# Patient Record
(demographics unavailable — no encounter records)

---

## 2024-10-24 NOTE — REVIEW OF SYSTEMS
[Nasal Discharge] : nasal discharge [Sore Throat] : no sore throat [Cough] : cough [Negative] : Psychiatric

## 2024-10-24 NOTE — HISTORY OF PRESENT ILLNESS
[FreeTextEntry8] : pt here today with sinus pressure, cough  no sob or wheeze no temps symptoms for 2 weeks.. sef med with Dayquil

## 2024-10-24 NOTE — ASSESSMENT
[FreeTextEntry1] : acute sinus- Augmentin 875mg bid for 10 days. Flonase ns. return if worse or persist.  HTN- continue amlodipine 5 mg, and lisinopril 40mg daily- follow-up with cardiology Hyperlipidemia- continue Repatha 140mg every other week.

## 2024-10-24 NOTE — PHYSICAL EXAM
[No Acute Distress] : no acute distress [Normal Outer Ear/Nose] : the outer ears and nose were normal in appearance [No Respiratory Distress] : no respiratory distress  [Normal Rate] : normal rate  [Soft] : abdomen soft [No Rash] : no rash

## 2024-11-01 NOTE — REVIEW OF SYSTEMS
[Orthopnea] : orthopnea [Depression] : depression [Negative] : Neurological [SOB] : no shortness of breath [Chest Discomfort] : no chest discomfort [Lower Ext Edema] : no extremity edema [Leg Claudication] : no intermittent leg claudication [Palpitations] : no palpitations [PND] : no PND [Syncope] : no syncope [FreeTextEntry5] : improved [FreeTextEntry9] : cold feet

## 2024-11-01 NOTE — PHYSICAL EXAM
[Well Developed] : well developed [Normal S1, S2] : normal S1, S2 [Soft] : abdomen soft [Clear Lung Fields] : clear lung fields [Non Tender] : non-tender [Normal Gait] : normal gait [No Edema] : no edema [Normal Appearance] : normal appearance [Normal Conjunctiva] : the conjunctiva exhibited no abnormalities [General Appearance - In No Acute Distress] : no acute distress [Eyelids - No Xanthelasma] : the eyelids demonstrated no xanthelasmas [Normal Oral Mucosa] : normal oral mucosa [No Oral Pallor] : no oral pallor [Normal Oropharynx] : normal oropharynx [Normal Jugular Venous V Waves Present] : normal jugular venous V waves present [Respiration, Rhythm And Depth] : normal respiratory rhythm and effort [Exaggerated Use Of Accessory Muscles For Inspiration] : no accessory muscle use [Bowel Sounds] : normal bowel sounds [Abdomen Soft] : soft [Abdomen Tenderness] : non-tender [Abnormal Walk] : normal gait [Nail Clubbing] : no clubbing of the fingernails [Cyanosis, Localized] : no localized cyanosis [Petechial Hemorrhages (___cm)] : no petechial hemorrhages [Skin Color & Pigmentation] : normal skin color and pigmentation [] : no rash [No Venous Stasis] : no venous stasis [Skin Lesions] : no skin lesions [Oriented To Time, Place, And Person] : oriented to person, place, and time [Affect] : the affect was normal [Mood] : the mood was normal [No Anxiety] : not feeling anxious [Not Palpable] : not palpable [No Precordial Heave] : no precordial heave was noted [Normal Rate] : normal [Rhythm Regular] : regular [Normal S1] : normal S1 [Normal S2] : normal S2 [II] : a grade 2 [0] : left 0 [FreeTextEntry1] : Decreased breath sounds left lung fields to about one half [S3] : no S3 [Right Carotid Bruit] : no bruit heard over the right carotid [Left Carotid Bruit] : no bruit heard over the left carotid [Bruit] : no bruit heard [___ +] : bilateral [unfilled]U+ pretibial pitting edema

## 2024-11-01 NOTE — DISCUSSION/SUMMARY
[EKG obtained to assist in diagnosis and management of assessed problem(s)] : EKG obtained to assist in diagnosis and management of assessed problem(s) [FreeTextEntry1] : Mr. Parson presents for followup .  Prior history as noted above.     Echocardiography was performed on 6/19/2023. Moderate concentric LVH was noted, with age-appropriate valvular calcification, mild mitral regurgitation and mild aortic stenosis. Echocardiography was performed August 13, 2024.  This revealed hyperdynamic LV systolic function with concentric hypertrophy.  The ascending aorta was borderline at 4.0 cm.  There was borderline pulmonary hypertension.  Mild aortic stenosis was present.   He has a history of statin intolerance.  He remains on Repatha.  He is hypertensive and edematous.  Handling the edema may lower the BP and make it easier for him to walk as well.  He will resume Lasix and Aldactone, with a BMP to be done in 2 weeks and another visit in a month.  He will check daily BP in the meantime.

## 2024-11-01 NOTE — CARDIOLOGY SUMMARY
[No Ischemia] : no Ischemia [___] : [unfilled] [LVEF ___%] : LVEF [unfilled]% [None] : no mitral regurgitation [de-identified] : November 7, 2023 sinus bradycardia January 17, 2024 sinus rhythm July 31, 2024 normal sinus rhythm November 1, 2024 normal sinus rhythm [de-identified] : 2017\par  LVEF 65% [de-identified] : 2017- hospital concentric LVH EF 60%  Pre op echo 6/19/23 moderate LVH, no wall motion abnormalities, NML RV [de-identified] : CABG x4 6/21/23 LIMA>LAD SVG> ramus SVG>Obtuse luna SVG>acute luna [de-identified] : 2018\par  RT moderate carotid atherosclerosis 16-49%\par  Lft 1-15%

## 2024-11-01 NOTE — HISTORY OF PRESENT ILLNESS
[FreeTextEntry1] : Fer Parson presented to the office today for a follow-up evaluation.  I saw him 3 months ago  He is now 74 years old, with a history of hypertension, hypercholesterolemia and TIAs/CVA.  He had a normal RENÉE in 2016, for screening purposes.  He has a history of peripheral vascular disease, with an occluded left superficial femoral artery.  He has had mild to moderate carotid artery plaque.  He has been intolerant to statins, and has been tolerating Repatha.  He has a history of obstructive sleep apnea, on CPAP.  More recently, he was discovered to have severe multivessel coronary artery disease, for which he is status post four-vessel CABG June 21, 2023.  He had a LIMA to the LAD, a saphenous vein graft to a ramus and OM1, and a saphenous vein graft to an acute marginal.  He had been evaluated recently in the office, and had been found to be hypertensive.  His medications were adjusted.  Most recently, he was given hydrochlorothiazide, which was increased, and carvedilol.  He presented to vascular surgery with symptoms of his legs feeling very cold, and unable to get them warm.  A left lower extremity arterial Doppler revealed a short segment SFA occlusion.  He also reported dyspnea with activity, and was referred here for an evaluation.  When I saw him, I was suspicious for the possibility of ischemic heart disease causing his symptoms.  He went on to have pharmacologic nuclear stress testing on June 7, 2023.  This revealed inferolateral and inferior ischemia.  Because of this, he was referred for cardiac catheterization, performed on June 19, 2023.  This revealed severe multivessel coronary artery disease, for which he was referred for coronary bypass surgery.  I saw him June 29, 2023, for a postoperative visit.  He had initially been feeling well, but unfortunately he had regressed.  He described  significant shortness of breath with exertion.  I appreciated decreased breath sounds suggestive of a left pleural effusion, and sent him for an x-ray.  Although we had discussed the possibility that he might require thoracentesis, the effusion was small, and recently increased his furosemide.  After starting the furosemide, he felt better almost immediately.    I saw him in the office in August 2023, prior to a planned revascularization for an occlusion of his right superficial femoral artery, on August 24, 2023.  I allowed him to proceed without additional testing at that time.  I saw him in the office in November 2023, and he was having ongoing issues with his leg, postoperatively, and a lot of trouble sleeping.  He had restless leg syndrome, and had started on medication which caused him to be very somnolent.  I saw him in July, 2024, at which time he was feeling well.  He was missing doses of hydralazine, and we discontinued it, resuming lisinopril instead.  I recommended follow-up blood work which came out well.  Echocardiography was performed August 13, 2024.  This revealed hyperdynamic LV systolic function with concentric hypertrophy.  The ascending aorta was borderline at 4.0 cm.  There was borderline pulmonary hypertension.  Mild aortic stenosis was present.  In terms of his surgery, he has been doing well.  He has fewer symptoms in terms of potential claudication.  He has some ongoing issues with edema of his leg, postoperatively.  He has also had edema of his left leg.  He remains a lot of trouble sleeping.  He had restless leg syndrome, for which he was started on Requip.   He denies symptoms of significant arrhythmia.  He has back issues, and has had increasing difficulty walking.  He has been evaluated by orthopedics and neurology, without success.  There have been no new treatments offered. He does not check his blood pressure often, but it usually in the 160s range.

## 2024-12-11 NOTE — CARDIOLOGY SUMMARY
[No Ischemia] : no Ischemia [___] : [unfilled] [LVEF ___%] : LVEF [unfilled]% [None] : no mitral regurgitation [de-identified] : November 7, 2023 sinus bradycardia January 17, 2024 sinus rhythm July 31, 2024 normal sinus rhythm November 1, 2024 normal sinus rhythm December 11, 2024 normal sinus rhythm [de-identified] : 2017\par  LVEF 65% [de-identified] : 2017- hospital concentric LVH EF 60%  Pre op echo 6/19/23 moderate LVH, no wall motion abnormalities, NML RV [de-identified] : CABG x4 6/21/23 LIMA>LAD SVG> ramus SVG>Obtuse luna SVG>acute luna [de-identified] : 2018\par  RT moderate carotid atherosclerosis 16-49%\par  Lft 1-15%

## 2024-12-11 NOTE — PHYSICAL EXAM
[Well Developed] : well developed [Normal S1, S2] : normal S1, S2 [Clear Lung Fields] : clear lung fields [Soft] : abdomen soft [Non Tender] : non-tender [Normal Gait] : normal gait [No Edema] : no edema [Normal Appearance] : normal appearance [General Appearance - In No Acute Distress] : no acute distress [Normal Conjunctiva] : the conjunctiva exhibited no abnormalities [Eyelids - No Xanthelasma] : the eyelids demonstrated no xanthelasmas [Normal Oral Mucosa] : normal oral mucosa [No Oral Pallor] : no oral pallor [Normal Oropharynx] : normal oropharynx [Normal Jugular Venous V Waves Present] : normal jugular venous V waves present [Respiration, Rhythm And Depth] : normal respiratory rhythm and effort [Exaggerated Use Of Accessory Muscles For Inspiration] : no accessory muscle use [Bowel Sounds] : normal bowel sounds [Abdomen Soft] : soft [Abdomen Tenderness] : non-tender [Abnormal Walk] : normal gait [Nail Clubbing] : no clubbing of the fingernails [Cyanosis, Localized] : no localized cyanosis [Petechial Hemorrhages (___cm)] : no petechial hemorrhages [Skin Color & Pigmentation] : normal skin color and pigmentation [] : no rash [No Venous Stasis] : no venous stasis [Skin Lesions] : no skin lesions [Oriented To Time, Place, And Person] : oriented to person, place, and time [Affect] : the affect was normal [Mood] : the mood was normal [No Anxiety] : not feeling anxious [Not Palpable] : not palpable [No Precordial Heave] : no precordial heave was noted [Normal Rate] : normal [Rhythm Regular] : regular [Normal S1] : normal S1 [Normal S2] : normal S2 [II] : a grade 2 [0] : left 0 [___ +] : bilateral [unfilled]U+ pretibial pitting edema [FreeTextEntry1] : Decreased breath sounds left lung fields to about one half [S3] : no S3 [Right Carotid Bruit] : no bruit heard over the right carotid [Left Carotid Bruit] : no bruit heard over the left carotid [Bruit] : no bruit heard

## 2024-12-11 NOTE — HISTORY OF PRESENT ILLNESS
[FreeTextEntry1] : Fer Parson presented to the office today for a follow-up evaluation.  I saw him 1 month ago  He is now 74 years old, with a history of hypertension, hypercholesterolemia and TIAs/CVA.  He had a normal RENÉE in 2016, for screening purposes.  He has a history of peripheral vascular disease, with an occluded left superficial femoral artery.  He has had mild to moderate carotid artery plaque.  He has been intolerant to statins, and has been tolerating Repatha.  He has a history of obstructive sleep apnea, on CPAP.  More recently, he was discovered to have severe multivessel coronary artery disease, for which he is status post four-vessel CABG June 21, 2023.  He had a LIMA to the LAD, a saphenous vein graft to a ramus and OM1, and a saphenous vein graft to an acute marginal.  He had been evaluated recently in the office, and had been found to be hypertensive.  His medications were adjusted.  Most recently, he was given hydrochlorothiazide, which was increased, and carvedilol.  He presented to vascular surgery with symptoms of his legs feeling very cold, and unable to get them warm.  A left lower extremity arterial Doppler revealed a short segment SFA occlusion.  He also reported dyspnea with activity, and was referred here for an evaluation.  When I saw him, I was suspicious for the possibility of ischemic heart disease causing his symptoms.  He went on to have pharmacologic nuclear stress testing on June 7, 2023.  This revealed inferolateral and inferior ischemia.  Because of this, he was referred for cardiac catheterization, performed on June 19, 2023.  This revealed severe multivessel coronary artery disease, for which he was referred for coronary bypass surgery.  I saw him June 29, 2023, for a postoperative visit.  He had initially been feeling well, but unfortunately he had regressed.  He described  significant shortness of breath with exertion.  I appreciated decreased breath sounds suggestive of a left pleural effusion, and sent him for an x-ray.  Although we had discussed the possibility that he might require thoracentesis, the effusion was small, and recently increased his furosemide.  After starting the furosemide, he felt better almost immediately.    I saw him in the office in August 2023, prior to a planned revascularization for an occlusion of his right superficial femoral artery, on August 24, 2023.  I allowed him to proceed without additional testing at that time.  I saw him in the office in November 2023, and he was having ongoing issues with his leg, postoperatively, and a lot of trouble sleeping.  He had restless leg syndrome, and had started on medication which caused him to be very somnolent.  I saw him in July, 2024, at which time he was feeling well.  He was missing doses of hydralazine, and we discontinued it, resuming lisinopril instead.  I recommended follow-up blood work which came out well.  Echocardiography was performed August 13, 2024.  This revealed hyperdynamic LV systolic function with concentric hypertrophy.  The ascending aorta was borderline at 4.0 cm.  There was borderline pulmonary hypertension.  Mild aortic stenosis was present.  I saw him in November, 2024 at which time he was hypertensive and edematous.  I suggested that he resume Lasix and spironolactone, and follow-up 1 month later.  He has been feeling better overall.  He had restless leg syndrome, for which he was started on Requip.   He denies symptoms of significant arrhythmia.  He has back issues, and has had increasing difficulty walking.  He has been evaluated by orthopedics and neurology, without success.  There have been no new treatments offered. He does check his blood pressure often, and it usually in the 120s-130s range. His edema is greatly improved, though his right leg which is postop, has been more edematous than the left.

## 2024-12-11 NOTE — DISCUSSION/SUMMARY
[FreeTextEntry1] : Mr. Parson presents for followup .  Prior history as noted above.     Echocardiography was performed on 6/19/2023. Moderate concentric LVH was noted, with age-appropriate valvular calcification, mild mitral regurgitation and mild aortic stenosis. Echocardiography was performed August 13, 2024.  This revealed hyperdynamic LV systolic function with concentric hypertrophy.  The ascending aorta was borderline at 4.0 cm.  There was borderline pulmonary hypertension.  Mild aortic stenosis was present.   He has a history of statin intolerance.  He remains on Repatha.  He was hypertensive and edematous.  At this point, he seems to have improved very nicely.  Although his blood pressure is a bit elevated today, he is not meaningfully edematous, and his blood pressure at home is normal.  I will make no further changes at this time.  I suggest a follow-up in about 3 months.    [EKG obtained to assist in diagnosis and management of assessed problem(s)] : EKG obtained to assist in diagnosis and management of assessed problem(s)

## 2025-04-02 NOTE — HISTORY OF PRESENT ILLNESS
[FreeTextEntry8] : Pt reports that he has chronic lower back pain for 1 year. The pain seems to occur after standing for a few minutes. After 5-6 minutes, his legs feel weak. Pt has been seeing Pain Management and has received several injections. Has had MRI LumbarSpine. Sees a spine specialist

## 2025-04-02 NOTE — ASSESSMENT
[FreeTextEntry1] : Lower back pain and leg weakness History of peripheral vascular disease Check labs

## 2025-05-01 NOTE — HISTORY OF PRESENT ILLNESS
[FreeTextEntry1] : 75 yo male presents to the office to evaluate bilateral lower extremities. He was here earlier this week to evaluate RLE fem pop bypass and carotid arteries. He returns for RENÉE testing today. History of PAD, chronic low back pain, HTN, HLD, CAD, CABG, and TIA. He had RLE fem to [place] pop bypass with PTFE with Dr. Islas 8/2023 for severe claudication and rest pain. Today, the pt does not have any new complaints regarding lower extremities. He has chronic weakness and "jelly" feeling in his legs for some time now. Otherwise, denies claudication, rest pain or wounds. His toenails have not been attended to in many months. He has appointment with his podiatrist soon. Takes asa, plavix and repatha. Smokes cigars.

## 2025-05-01 NOTE — ASSESSMENT
[FreeTextEntry1] : Impression PAD s/p RLE fem to [place] pop bypass with PTFE with Dr. Islas in 8/2023, chronic RLE edema Pt is asymptomatic overall   Plan Conservative management with leg elevation and mild 15-20 mm hg compression stockings for leg edema Foot care and protection and exercise regimen Smoking cessation discussed at bedside dur 5 min Follow up with podiatry Continue asa and cholesterol medication Return to office in 6 months October 2025 to evaluate bilateral lower extremities with RENÉE testing and RLE bypass with RLE arterial doppler [Arterial/Venous Disease] : arterial/venous disease [Medication Management] : medication management [Foot care/Footwear] : foot care/footwear [Smoking Cessation] : smoking cessation

## 2025-05-01 NOTE — DATA REVIEWED
[FreeTextEntry1] : 4/25/2025 RENÉE Rt RENÉE 1.21; Rt TBI 0.61; TP 86 Lt RENÉE 0.99; Lt TBI 0.48; TP 68

## 2025-05-01 NOTE — PHYSICAL EXAM
[2+] : left 2+ [0] : left 0 [No Rash or Lesion] : No rash or lesion [Alert] : alert [Oriented to Person] : oriented to person [Oriented to Place] : oriented to place [Oriented to Time] : oriented to time [Calm] : calm [de-identified] : NAD [de-identified] : NCAT [de-identified] : unlabored breathing [FreeTextEntry1] : bilateral lower extremities soft, warm and nontender mild RLE edema no wounds

## 2025-06-23 NOTE — DATA REVIEWED
[MRI] : MRI [Lumbar Spine] : lumbar spine [Report was reviewed and noted in the chart] : The report was reviewed and noted in the chart [I independently reviewed and interpreted images and report] : I independently reviewed and interpreted images and report [FreeTextEntry1] : @[facility] MRI 3/20/24 1. Multilevel mild-moderate spinal stenosis w/o significant nerve compromise.

## 2025-06-23 NOTE — HISTORY OF PRESENT ILLNESS
[de-identified] : 6/23/25: f/u LS. Has been taking tramadol with no relief. PM has been monitoring oxy with relief. Had multiple LESI L45, L5S1, and MBB with short term relief. Has not had SIJ done yet, pending.   9/3/2024: 75 y/o M presenting for lower back for over a year. Pain radiates down into groin to knees, no N/T. Feels legs weak and give out. Sitting alleviates pain. Saw Nicola on 3/2024, had MRI completed @[facility]. Saw pain mgmt, had TY's x2 within this year, helped temporary (1-2 weeks).   pmhx: quadruple bypass, peripheral arterial blockage in RLE.

## 2025-06-23 NOTE — IMAGING
[de-identified] : L Spine Inspection: No defects or deformities Palpation: No tenderness or spasms in b/l lumbar paraspinal musculature ROM: diminished in all planes Strength: 5/5 b/l hip flexors, knee extensors, ankle dorsiflexors, EHL, ankle plantarflexors Neuro: Sensation LT I Negative b/l SLR Toe and heal walk intact Gait non antalgic

## 2025-06-23 NOTE — ASSESSMENT
[FreeTextEntry1] : 74 y/o M with chronic low back pain with b/l radiculopathy x year. 2024 MRI shows multilevel mild-moderate spinal stenosis. Tried PT and received multiple injections, recently LESIx2 and MBB, provided short term relief (1-2 weeks).  Mian does have important cardiac/vascular history that should be considered.   Updated L MRI Will get injection reports to determine if SIJ was done f/u to discuss results

## 2025-07-08 NOTE — DATA REVIEWED
[MRI] : MRI [Lumbar Spine] : lumbar spine [Report was reviewed and noted in the chart] : The report was reviewed and noted in the chart [I independently reviewed and interpreted images and report] : I independently reviewed and interpreted images and report [FreeTextEntry1] : @[facility] MRI 6/24/25 1. L4-5 listhesis 2. No focal nerve root compression or significant stenosis.   @[facility] MRI 3/20/24 1. Multilevel mild-moderate spinal stenosis w/o significant nerve compromise.

## 2025-07-08 NOTE — IMAGING
[de-identified] : L Spine Inspection: No defects or deformities Palpation: No tenderness or spasms in b/l lumbar paraspinal musculature ROM: diminished in all planes Strength: 5/5 b/l hip flexors, knee extensors, ankle dorsiflexors, EHL, ankle plantarflexors Neuro: Sensation LT I Negative b/l SLR Toe and heal walk intact Gait non antalgic

## 2025-07-08 NOTE — HISTORY OF PRESENT ILLNESS
[de-identified] : 7/8/25: Follow up L Spine. PM increased dosage of pain meds, helps. Main complaint is achiness in lower back and BLE, especially when standing. MRI review @[facility].   6/23/25: f/u LS. Has been taking tramadol with no relief. PM has been monitoring oxy with relief. Had multiple LESI L45, L5S1, and MBB with short term relief. Has not had SIJ done yet, pending.   9/3/2024: 75 y/o M presenting for lower back for over a year. Pain radiates down into groin to knees, no N/T. Feels legs weak and give out. Sitting alleviates pain. Saw Nicola on 3/2024, had MRI completed [facility]. Saw pain mgmt, had TY's x2 within this year, helped temporary (1-2 weeks).   pmhx: quadruple bypass, peripheral arterial blockage in RLE.

## 2025-07-08 NOTE — ASSESSMENT
[FreeTextEntry1] : 74 y/o M with chronic low back pain with b/l radiculopathy x years 2024. Updated MRI: listhesis at L4-5, no nerve root compression or significant stenosis. Tried PT and received multiple injections, recently LESIx2 and MBB, provided short term relief (1-2 weeks). Taking pain meds with some benefit.   -  Main does have important cardiac/vascular history that should be considered.  - Assured Mian no orthopedic surgical intervention is required.  - Referred to PM&R to eval further possible Rheumatological/Neurological disorder

## 2025-07-22 NOTE — HISTORY OF PRESENT ILLNESS
[FreeTextEntry1] : Patient is a 75-year-old with a history of chronic low back pain.  Patient reports onset of pain approximately 2 to 3 years ago.  Patient reports having undergone lumbar TY's as well as MMB injections to the lumbar spine performed at Dr. Luong's office.  He is uncertain long-term relief.  However, he does report that he is now able to sleep through the night without difficulty.  Patient reports that he does experience some low back pain with ambulation.  However his primary complaint is difficulty with ambulation.  He reports that he ambulates with a waddling gait pattern and feels unsteady with ambulation.  He does not believe this is related to any low back pain.  However he does report with ambulation of more than 5 to 10 minutes he does experience some low back pain.  Patient has been under the care of of orthopedist , MRI studies of the lumbar spine have been obtained and he reports that he was told that he is not a surgical candidate as relates to his low back.  Patient and his wife informing that his lifestyle has become extremely sedentary and notes that he fatigues easily with ambulation.  He remains under the care of pain management and reports he does take hydrocodone for analgesia.  Patient referred to my office today for evaluation and treatment.

## 2025-07-22 NOTE — PHYSICAL EXAM
[FreeTextEntry1] : EXAMINATION OF LUMBAR SPINE & LOWER EXTREMITIES:   Upon Inspection: Patient maintains a forward flexed posture.   Reflexes (R) L/E:                   Quadriceps 3+                    Achilles 3+  Reflexes (L) L/E:                    Quadriceps 3+                    Achilles 3+   Sensory L/E: Decreased left L5 dermatome, otherwise intact      [good ] Peripheral Pulses Bilateral L/E   Patient ambulates with a forward flexed posture at the waist.  Narrow base with short swing through phase during ambulation cycle.   Testing: Patricks (R) [negative]               Patricks (L) [negative]               Babinski [ downgoing bilaterally]               Chaddock [ negative bilaterally]               Oppenheim [ negative bilaterally]               Gonda [ negative bilaterally]               Clonus [ No ankle bilaterally]               Leseagues (R) [negative]               Leseagues (L) [negative]               Kemps [negative]               Romberg negative SI Jt Lig.Challenege Test (R) negative SI Jt Lig.Challenege Test (L) negative  S.L.R. (R) -60 degrees hamstrings tight S.L.R. (L) -60 degrees hamstrings tight Range of motion testing was performed with the use of a goniometer.                           Flexion: 50 degrees (normal - 75-90)                           Extension: 10 degrees (normal - 30)                           Lateral Bending (R): 25 degrees (normal - 35)                           Lateral Bending (L): 20 degrees (normal - 35)                           Thoracic Rotation (R): 25 degrees (normal - 35)                           Thoracic Rotation (L): 30 degrees (normal - 35)                           Internal Rotation Femur (R): mild restriction                            External Rotation Femur (R): wnl                           Internal Rotation Femur (L): mild restriction                           External Rotation Femur (L): wnl  Vibratory: intact  Proprioception: intact MMT: Manual muscle testing of lower extremities grossly intact Palpation of the Lumbar Spine: nontender. Manual muscle testing of the upper extremities grossly intact Sensory examination of the upper extremities grossly intact Reflexes are 3+ (hyperreflexic) throughout upper extremities There is limited range of motion involving the cervical spine in all planes but no tenderness is reported on palpation.

## 2025-07-22 NOTE — ASSESSMENT
[FreeTextEntry1] : Referral to  for evaluation of gait disturbance. [phone]  Patient with chronic low back pain, however patients primary complaint at current time is difficulty with ambulation. He reports ambulating with a waddling gait pattern. Upon examination he ambulates in a forward flexed posture with narrow base and short swing through phase during ambulation cycle.  Patient also demonstrates hyperreflexia involving upper and lower extremities. Please evaluate this patient.  Recheck after neurological consultation for appropriate treatment plan. Recheck 6 weeks   At least 60 minutes was spent with patient face to face examining patient, reviewing findings/results, counseling patient and coordinating treatment program. Ample time was provided to answer any questions or address concerns to the patients satisfaction.  This note was generated by using Dragon medical dictation software. A reasonable effort has been made for proofreading its contents, but typos may still remain. If there are any questions or points of clarification needed, please notify my office.

## 2025-07-22 NOTE — DATA REVIEWED
[Other: ___] : [unfilled] [MRI] : MRI [FreeTextEntry1] : MRI LUMBAR SPINE 06/24/25  W/  02/10/25 SACROILIAC JOINT INJ 09/23/2024 LUMBAR MBB INJ 06/17/24 LUMBAR MBB INJ 03/25/24 LUMBAR EPIDURAL INJ 01/29/24 LUMBAR EPIDURAL INJ

## 2025-07-28 NOTE — HISTORY OF PRESENT ILLNESS
[FreeTextEntry1] : Follow up for Anxiety and Depression [de-identified] : LESVIA MEZA is a 75 year old M who presents today for Anxiety and Depression lost 20 pounds shuffling gait poor appetite poor  sees Cardiologist Has ASHD CAD

## 2025-07-28 NOTE — PHYSICAL EXAM
[No Acute Distress] : no acute distress [Well Nourished] : well nourished [Well Developed] : well developed [Well-Appearing] : well-appearing [Normal Sclera/Conjunctiva] : normal sclera/conjunctiva [PERRL] : pupils equal round and reactive to light [EOMI] : extraocular movements intact [Normal Outer Ear/Nose] : the outer ears and nose were normal in appearance [Normal Oropharynx] : the oropharynx was normal [No JVD] : no jugular venous distention [No Lymphadenopathy] : no lymphadenopathy [Supple] : supple [Thyroid Normal, No Nodules] : the thyroid was normal and there were no nodules present [No Respiratory Distress] : no respiratory distress  [No Accessory Muscle Use] : no accessory muscle use [Clear to Auscultation] : lungs were clear to auscultation bilaterally [Normal Rate] : normal rate  [Regular Rhythm] : with a regular rhythm [Normal S1, S2] : normal S1 and S2 [No Murmur] : no murmur heard [No Carotid Bruits] : no carotid bruits [No Abdominal Bruit] : a ~M bruit was not heard ~T in the abdomen [No Varicosities] : no varicosities [Pedal Pulses Present] : the pedal pulses are present [No Edema] : there was no peripheral edema [No Palpable Aorta] : no palpable aorta [No Extremity Clubbing/Cyanosis] : no extremity clubbing/cyanosis [Soft] : abdomen soft [Non Tender] : non-tender [Non-distended] : non-distended [No Masses] : no abdominal mass palpated [No HSM] : no HSM [Normal Bowel Sounds] : normal bowel sounds [Normal Supraclavicular Nodes] : no supraclavicular lymphadenopathy [Normal Posterior Cervical Nodes] : no posterior cervical lymphadenopathy [Normal Anterior Cervical Nodes] : no anterior cervical lymphadenopathy [No CVA Tenderness] : no CVA  tenderness [No Spinal Tenderness] : no spinal tenderness [No Joint Swelling] : no joint swelling [Grossly Normal Strength/Tone] : grossly normal strength/tone [No Rash] : no rash [Coordination Grossly Intact] : coordination grossly intact [No Focal Deficits] : no focal deficits [Deep Tendon Reflexes (DTR)] : deep tendon reflexes were 2+ and symmetric [Speech Grossly Normal] : speech grossly normal [Memory Grossly Normal] : memory grossly normal [Normal Affect] : the affect was normal [Normal Mood] : the mood was normal [Normal Insight/Judgement] : insight and judgment were intact [de-identified] : Eben brown

## 2025-07-28 NOTE — HEALTH RISK ASSESSMENT
[No] : In the past 12 months have you used drugs other than those required for medical reasons? No [One fall no injury in past year] : Patient reported one fall in the past year without injury [2] : 1) Little interest or pleasure doing things for more than half of the days (2) [3] : 2) Feeling down, depressed, or hopeless for nearly every day (3) [Nearly Every Day (3)] : 4.) Feeling tired or having little energy? Nearly every day [1/2 of Days or More (2)] : 5.) Poor appetite or overeating? Half the days or more [Several Days (1)] : 8.) Moving or speaking so slowly that other people could have noticed, or the opposite, moving or speaking faster than usual? Several days [Not at All (0)] : 9.) Thoughts that you would be off dead or of hurting yourself in some way? Not at all [Moderately Severe] : Severity of Depression is Moderately Severe [Very Difficult] : How difficult have these problems made it for you to do your work, take care of things at home, or get along with people? Very difficult [Never] : Never [[id number]] : 16